# Patient Record
Sex: FEMALE | Race: WHITE
[De-identification: names, ages, dates, MRNs, and addresses within clinical notes are randomized per-mention and may not be internally consistent; named-entity substitution may affect disease eponyms.]

---

## 2017-12-21 ENCOUNTER — HOSPITAL ENCOUNTER (OUTPATIENT)
Dept: HOSPITAL 35 - MRI | Age: 67
End: 2017-12-21
Attending: FAMILY MEDICINE
Payer: COMMERCIAL

## 2017-12-21 DIAGNOSIS — M25.561: Primary | ICD-10-CM

## 2019-01-10 ENCOUNTER — HOSPITAL ENCOUNTER (OUTPATIENT)
Dept: HOSPITAL 61 - PCVCCLINIC | Age: 69
Discharge: HOME | End: 2019-01-10
Attending: INTERNAL MEDICINE
Payer: MEDICARE

## 2019-01-10 DIAGNOSIS — I73.9: Primary | ICD-10-CM

## 2019-01-10 DIAGNOSIS — R42: ICD-10-CM

## 2019-01-10 DIAGNOSIS — F41.9: ICD-10-CM

## 2019-01-10 DIAGNOSIS — E78.00: ICD-10-CM

## 2019-01-10 DIAGNOSIS — Z82.49: ICD-10-CM

## 2019-01-10 DIAGNOSIS — R00.2: ICD-10-CM

## 2019-01-10 DIAGNOSIS — R06.09: ICD-10-CM

## 2019-01-10 PROCEDURE — 36415 COLL VENOUS BLD VENIPUNCTURE: CPT

## 2019-01-10 PROCEDURE — 80061 LIPID PANEL: CPT

## 2019-01-10 PROCEDURE — G0463 HOSPITAL OUTPT CLINIC VISIT: HCPCS

## 2019-01-10 PROCEDURE — 93005 ELECTROCARDIOGRAM TRACING: CPT

## 2019-02-22 ENCOUNTER — HOSPITAL ENCOUNTER (OUTPATIENT)
Dept: HOSPITAL 35 - CAT | Age: 69
End: 2019-02-22
Attending: FAMILY MEDICINE
Payer: COMMERCIAL

## 2019-02-22 ENCOUNTER — HOSPITAL ENCOUNTER (OUTPATIENT)
Dept: HOSPITAL 61 - PCVCIMAG | Age: 69
Discharge: HOME | End: 2019-02-22
Attending: INTERNAL MEDICINE
Payer: MEDICARE

## 2019-02-22 DIAGNOSIS — R00.2: ICD-10-CM

## 2019-02-22 DIAGNOSIS — R06.09: ICD-10-CM

## 2019-02-22 DIAGNOSIS — I73.9: Primary | ICD-10-CM

## 2019-02-22 DIAGNOSIS — Z13.6: Primary | ICD-10-CM

## 2019-02-22 DIAGNOSIS — R00.0: ICD-10-CM

## 2019-02-22 PROCEDURE — 93925 LOWER EXTREMITY STUDY: CPT

## 2019-02-22 PROCEDURE — 93351 STRESS TTE COMPLETE: CPT

## 2019-02-22 PROCEDURE — 93325 DOPPLER ECHO COLOR FLOW MAPG: CPT

## 2019-02-22 NOTE — PCVCIMAG
--------------- APPROVED REPORT --------------





Study performed:  02/22/2019 13:22:08



Exam:  Stress Echocardiogram

Indication: Palpitations, dyspnea, sinus tachycardia

Patient Location: Echo lab

Stress Nurse: Ally Monaco RN

Status: routine



Ht: 5 ft 2 in  

HR: 68 bpm      BP: 116/80 mmHg

Rhythm: NSR



Procedure

The patient underwent an Exercise Stress Test using the Baldemar 

Protocol. Blood pressure, heart rate, and EKG were monitored.

An Echocardiogram was performed by technician in four stages in quad 

fashion.  At peak stress, four selected images were obtained and 

placed side by side with resting images for comparison.



Stress Test Details

Stress Test:  Exercise stress testing was performed using a Baldemar 

protocol.

HR

Resting HR:            75 bpmMax Heart Rate (APMHR): 152 bpm 

Max HR Achieved:  139 bpmTarget HR (85% APMHR): 129 bpm

% of APMHR:         91

Recovery HR:            96 bpm

HR response to stress: Normal HR response to stress



BP

Resting BP:  116/80 mmHg

Max BP:       164/80 mmHg

Recovery BP:       130/74 mmHg

BP response to stress: Normal blood pressure response to 

stress.

ECG

Resting ECG:  Sinus Rhythm

Stress ECG:     Sinus Rhythm

ST Change: Normal

Arrhythmia:    None

Recovery ECG: Sinus Rhythm

Recovery ST Change: Normal

Recovery Arrhythmia: None



Clinical

Reason for Termination: Maximal effort

Stress Symptoms: Dyspnea

Exercise duration: 8 min 1 sec

Highest Stage Achieved: Stage 3: 3.4 mph at 14% grade. 

Exercise capacity: 10.1 METs

Overall Exercise Capacity for Age: Normal

Scale: Active

Angina Score: None



Pre-Stress Echo

The resting Echocardiogram showed normal left ventricular 

contractility with an estimated Ejection Fraction of about >55%. 

Normal wall motion in all segments on baseline images.



Post-Stress Echo

The stress Echocardiogram showed normal left ventricular 

contractility with an estimated Ejection Fraction of about 65%. 

Normal augmentation of wall motion in all segments on post stress 

images.



Clinical

No clinical or ECG evidence for ischemia.



Conclusion

Clinical Response:  Non-ischemic

Exercise Capacity:  Superior

Stress ECG Response:  Non-ischemic

Stress Echo Images:  Non-ischemic

The left ventricle is normal in size and wall thickness in both the 

rest and stress images.



Other Information

Study Quality: Adequate



<Conclusion>

The left ventricle is normal in size and wall thickness in both the 

rest and stress images.

## 2019-02-22 NOTE — PCVCIMAG
EXAM: BILATERAL LOWER EXTREMITY ARTERIAL DUPLEX



INDICATION: Peripheral Arterial Disease. Leg pain.



FINDINGS: 

Right Leg: Satisfactory arterial waveforms throughout the

common/profunda/superficial femoral, popliteal, anterior tibial,

peroneal, and posterior tibial arteries. No flow limiting stenosis

seen.    



Left Leg:  Satisfactory arterial waveforms throughout the

common/profunda/superficial femoral, popliteal, anterior tibial,

peroneal, and posterior tibial arteries. No flow limiting stenosis

seen.    



IMPRESSION: 

No flow limiting stenosis in the right lower extremity.

No flow limiting stenosis in the left lower extremity.

   



LOC:OFFICE

## 2019-05-28 ENCOUNTER — HOSPITAL ENCOUNTER (OUTPATIENT)
Dept: HOSPITAL 35 - NUC | Age: 69
End: 2019-05-28
Attending: FAMILY MEDICINE
Payer: COMMERCIAL

## 2019-05-28 DIAGNOSIS — M85.88: ICD-10-CM

## 2019-05-28 DIAGNOSIS — M81.0: Primary | ICD-10-CM

## 2020-08-19 ENCOUNTER — HOSPITAL ENCOUNTER (OUTPATIENT)
Dept: HOSPITAL 35 - SJCVC | Age: 70
End: 2020-08-19
Attending: INTERNAL MEDICINE
Payer: COMMERCIAL

## 2020-08-19 DIAGNOSIS — I51.7: ICD-10-CM

## 2020-08-19 DIAGNOSIS — Z79.899: ICD-10-CM

## 2020-08-19 DIAGNOSIS — R94.31: Primary | ICD-10-CM

## 2020-08-19 DIAGNOSIS — E78.5: ICD-10-CM

## 2020-08-19 DIAGNOSIS — I73.00: ICD-10-CM

## 2020-09-30 ENCOUNTER — HOSPITAL ENCOUNTER (OUTPATIENT)
Dept: HOSPITAL 35 - SJCVCIMAG | Age: 70
End: 2020-09-30
Attending: INTERNAL MEDICINE
Payer: COMMERCIAL

## 2020-09-30 DIAGNOSIS — I07.1: Primary | ICD-10-CM

## 2020-09-30 DIAGNOSIS — E78.5: ICD-10-CM

## 2020-09-30 DIAGNOSIS — Z78.0: ICD-10-CM

## 2021-10-19 ENCOUNTER — HOSPITAL ENCOUNTER (OUTPATIENT)
Dept: HOSPITAL 35 - SJCVC | Age: 71
End: 2021-10-19
Attending: INTERNAL MEDICINE
Payer: COMMERCIAL

## 2021-10-19 DIAGNOSIS — Z79.899: ICD-10-CM

## 2021-10-19 DIAGNOSIS — I51.7: ICD-10-CM

## 2021-10-19 DIAGNOSIS — R00.2: ICD-10-CM

## 2021-10-19 DIAGNOSIS — E78.5: ICD-10-CM

## 2021-10-19 DIAGNOSIS — Z88.2: ICD-10-CM

## 2021-10-19 DIAGNOSIS — R94.31: Primary | ICD-10-CM

## 2021-10-19 DIAGNOSIS — Z72.89: ICD-10-CM

## 2021-10-19 DIAGNOSIS — I73.00: ICD-10-CM

## 2021-10-19 DIAGNOSIS — Z88.5: ICD-10-CM
